# Patient Record
Sex: FEMALE | Race: ASIAN | NOT HISPANIC OR LATINO
[De-identification: names, ages, dates, MRNs, and addresses within clinical notes are randomized per-mention and may not be internally consistent; named-entity substitution may affect disease eponyms.]

---

## 2022-11-21 PROBLEM — Z00.00 ENCOUNTER FOR PREVENTIVE HEALTH EXAMINATION: Status: ACTIVE | Noted: 2022-11-21

## 2022-11-28 ENCOUNTER — APPOINTMENT (OUTPATIENT)
Dept: OTOLARYNGOLOGY | Facility: CLINIC | Age: 52
End: 2022-11-28

## 2022-11-28 VITALS
HEART RATE: 91 BPM | BODY MASS INDEX: 28.7 KG/M2 | DIASTOLIC BLOOD PRESSURE: 107 MMHG | HEIGHT: 63 IN | SYSTOLIC BLOOD PRESSURE: 153 MMHG | WEIGHT: 162 LBS | TEMPERATURE: 96 F

## 2022-11-28 DIAGNOSIS — D44.2 NEOPLASM OF UNCERTAIN BEHAVIOR OF PARATHYROID GLAND: ICD-10-CM

## 2022-11-28 PROCEDURE — 31575 DIAGNOSTIC LARYNGOSCOPY: CPT

## 2022-11-28 PROCEDURE — 99203 OFFICE O/P NEW LOW 30 MIN: CPT | Mod: 25

## 2023-01-12 ENCOUNTER — TRANSCRIPTION ENCOUNTER (OUTPATIENT)
Age: 53
End: 2023-01-12

## 2023-01-12 VITALS
TEMPERATURE: 97 F | WEIGHT: 155.43 LBS | DIASTOLIC BLOOD PRESSURE: 83 MMHG | OXYGEN SATURATION: 97 % | HEIGHT: 63 IN | RESPIRATION RATE: 16 BRPM | SYSTOLIC BLOOD PRESSURE: 133 MMHG | HEART RATE: 76 BPM

## 2023-01-12 NOTE — ASU PREOP CHECKLIST - 1.
Informed ENt team that H&P  as its from 22. Also informed ENT team and anesthesia that there is no EKG on file and only blood work on file is CBC. No new orders at this time. Informed ENt team that H&P  as its from 22. Also informed ENT team and anesthesia that there is no EKG on file and only blood work on file is CBC.

## 2023-01-12 NOTE — ASU PATIENT PROFILE, ADULT - NSICDXPASTMEDICALHX_GEN_ALL_CORE_FT
PAST MEDICAL HISTORY:  Migraines     Prediabetes      PAST MEDICAL HISTORY:  Hyperlipidemia     Migraines     Prediabetes

## 2023-01-12 NOTE — ASU PATIENT PROFILE, ADULT - FALL HARM RISK - UNIVERSAL INTERVENTIONS
Bed in lowest position, wheels locked, appropriate side rails in place/Call bell, personal items and telephone in reach/Instruct patient to call for assistance before getting out of bed or chair/Non-slip footwear when patient is out of bed/[place] to call system/Physically safe environment - no spills, clutter or unnecessary equipment/Purposeful Proactive Rounding/Room/bathroom lighting operational, light cord in reach

## 2023-01-13 ENCOUNTER — INPATIENT (INPATIENT)
Facility: HOSPITAL | Age: 53
LOS: 2 days | Discharge: HOME | DRG: 626 | End: 2023-01-16
Attending: SPECIALIST | Admitting: SPECIALIST
Payer: COMMERCIAL

## 2023-01-13 ENCOUNTER — RESULT REVIEW (OUTPATIENT)
Age: 53
End: 2023-01-13

## 2023-01-13 ENCOUNTER — TRANSCRIPTION ENCOUNTER (OUTPATIENT)
Age: 53
End: 2023-01-13

## 2023-01-13 ENCOUNTER — APPOINTMENT (OUTPATIENT)
Dept: OTOLARYNGOLOGY | Facility: HOSPITAL | Age: 53
End: 2023-01-13

## 2023-01-13 LAB
ANION GAP SERPL CALC-SCNC: 9 MMOL/L — SIGNIFICANT CHANGE UP (ref 5–17)
APTT BLD: 36.6 SEC — HIGH (ref 27.5–35.5)
BUN SERPL-MCNC: 17 MG/DL — SIGNIFICANT CHANGE UP (ref 7–23)
CALCIUM SERPL-MCNC: 10 MG/DL — SIGNIFICANT CHANGE UP (ref 8.4–10.5)
CALCIUM SERPL-MCNC: 11.4 MG/DL — HIGH (ref 8.4–10.5)
CHLORIDE SERPL-SCNC: 106 MMOL/L — SIGNIFICANT CHANGE UP (ref 96–108)
CO2 SERPL-SCNC: 27 MMOL/L — SIGNIFICANT CHANGE UP (ref 22–31)
CREAT SERPL-MCNC: 0.77 MG/DL — SIGNIFICANT CHANGE UP (ref 0.5–1.3)
EGFR: 93 ML/MIN/1.73M2 — SIGNIFICANT CHANGE UP
GLUCOSE BLDC GLUCOMTR-MCNC: 123 MG/DL — HIGH (ref 70–99)
GLUCOSE BLDC GLUCOMTR-MCNC: 150 MG/DL — HIGH (ref 70–99)
GLUCOSE BLDC GLUCOMTR-MCNC: 155 MG/DL — HIGH (ref 70–99)
GLUCOSE BLDC GLUCOMTR-MCNC: 180 MG/DL — HIGH (ref 70–99)
GLUCOSE SERPL-MCNC: 129 MG/DL — HIGH (ref 70–99)
HCT VFR BLD CALC: 43.5 % — SIGNIFICANT CHANGE UP (ref 34.5–45)
HGB BLD-MCNC: 13.9 G/DL — SIGNIFICANT CHANGE UP (ref 11.5–15.5)
INR BLD: 0.96 — SIGNIFICANT CHANGE UP (ref 0.88–1.16)
MCHC RBC-ENTMCNC: 27.3 PG — SIGNIFICANT CHANGE UP (ref 27–34)
MCHC RBC-ENTMCNC: 32 GM/DL — SIGNIFICANT CHANGE UP (ref 32–36)
MCV RBC AUTO: 85.3 FL — SIGNIFICANT CHANGE UP (ref 80–100)
NRBC # BLD: 0 /100 WBCS — SIGNIFICANT CHANGE UP (ref 0–0)
PLATELET # BLD AUTO: 193 K/UL — SIGNIFICANT CHANGE UP (ref 150–400)
POTASSIUM SERPL-MCNC: 4.4 MMOL/L — SIGNIFICANT CHANGE UP (ref 3.5–5.3)
POTASSIUM SERPL-SCNC: 4.4 MMOL/L — SIGNIFICANT CHANGE UP (ref 3.5–5.3)
PROTHROM AB SERPL-ACNC: 11.4 SEC — SIGNIFICANT CHANGE UP (ref 10.5–13.4)
PTH-INTACT IO % DIF SERPL: 13 PG/ML — LOW (ref 15–65)
PTH-INTACT IO % DIF SERPL: 15.7 PG/ML — SIGNIFICANT CHANGE UP (ref 15–65)
PTH-INTACT IO % DIF SERPL: 77.8 PG/ML — HIGH (ref 15–65)
RBC # BLD: 5.1 M/UL — SIGNIFICANT CHANGE UP (ref 3.8–5.2)
RBC # FLD: 13.6 % — SIGNIFICANT CHANGE UP (ref 10.3–14.5)
SODIUM SERPL-SCNC: 142 MMOL/L — SIGNIFICANT CHANGE UP (ref 135–145)
WBC # BLD: 6.92 K/UL — SIGNIFICANT CHANGE UP (ref 3.8–10.5)
WBC # FLD AUTO: 6.92 K/UL — SIGNIFICANT CHANGE UP (ref 3.8–10.5)

## 2023-01-13 PROCEDURE — 88331 PATH CONSLTJ SURG 1 BLK 1SPC: CPT | Mod: 26

## 2023-01-13 PROCEDURE — 88305 TISSUE EXAM BY PATHOLOGIST: CPT | Mod: 26

## 2023-01-13 PROCEDURE — 60500 EXPLORE PARATHYROID GLANDS: CPT

## 2023-01-13 DEVICE — CLIP APPLIER ETHICON LIGACLIP 11.5" MEDIUM: Type: IMPLANTABLE DEVICE | Status: FUNCTIONAL

## 2023-01-13 DEVICE — CLIP APPLIER ETHICON LIGACLIP 9 3/8" SMALL: Type: IMPLANTABLE DEVICE | Status: FUNCTIONAL

## 2023-01-13 DEVICE — SURGICEL 4 X 8": Type: IMPLANTABLE DEVICE | Status: FUNCTIONAL

## 2023-01-13 RX ORDER — SODIUM CHLORIDE 9 MG/ML
1000 INJECTION, SOLUTION INTRAVENOUS
Refills: 0 | Status: DISCONTINUED | OUTPATIENT
Start: 2023-01-13 | End: 2023-01-16

## 2023-01-13 RX ORDER — DEXTROSE 50 % IN WATER 50 %
12.5 SYRINGE (ML) INTRAVENOUS ONCE
Refills: 0 | Status: DISCONTINUED | OUTPATIENT
Start: 2023-01-13 | End: 2023-01-16

## 2023-01-13 RX ORDER — METFORMIN HYDROCHLORIDE 850 MG/1
0 TABLET ORAL
Qty: 0 | Refills: 0 | DISCHARGE

## 2023-01-13 RX ORDER — OXYCODONE HYDROCHLORIDE 5 MG/1
10 TABLET ORAL EVERY 6 HOURS
Refills: 0 | Status: DISCONTINUED | OUTPATIENT
Start: 2023-01-13 | End: 2023-01-16

## 2023-01-13 RX ORDER — ACETAMINOPHEN 500 MG
1000 TABLET ORAL ONCE
Refills: 0 | Status: COMPLETED | OUTPATIENT
Start: 2023-01-13 | End: 2023-01-13

## 2023-01-13 RX ORDER — OXYCODONE AND ACETAMINOPHEN 5; 325 MG/1; MG/1
1 TABLET ORAL EVERY 6 HOURS
Refills: 0 | Status: DISCONTINUED | OUTPATIENT
Start: 2023-01-13 | End: 2023-01-13

## 2023-01-13 RX ORDER — BENZOCAINE AND MENTHOL 5; 1 G/100ML; G/100ML
1 LIQUID ORAL
Refills: 0 | Status: DISCONTINUED | OUTPATIENT
Start: 2023-01-13 | End: 2023-01-16

## 2023-01-13 RX ORDER — HYDROMORPHONE HYDROCHLORIDE 2 MG/ML
0.5 INJECTION INTRAMUSCULAR; INTRAVENOUS; SUBCUTANEOUS ONCE
Refills: 0 | Status: DISCONTINUED | OUTPATIENT
Start: 2023-01-13 | End: 2023-01-13

## 2023-01-13 RX ORDER — DEXTROSE 50 % IN WATER 50 %
15 SYRINGE (ML) INTRAVENOUS ONCE
Refills: 0 | Status: DISCONTINUED | OUTPATIENT
Start: 2023-01-13 | End: 2023-01-16

## 2023-01-13 RX ORDER — OXYCODONE AND ACETAMINOPHEN 5; 325 MG/1; MG/1
2 TABLET ORAL EVERY 6 HOURS
Refills: 0 | Status: DISCONTINUED | OUTPATIENT
Start: 2023-01-13 | End: 2023-01-13

## 2023-01-13 RX ORDER — OXYCODONE HYDROCHLORIDE 5 MG/1
10 TABLET ORAL EVERY 4 HOURS
Refills: 0 | Status: DISCONTINUED | OUTPATIENT
Start: 2023-01-13 | End: 2023-01-13

## 2023-01-13 RX ORDER — ACETAMINOPHEN 500 MG
650 TABLET ORAL EVERY 6 HOURS
Refills: 0 | Status: DISCONTINUED | OUTPATIENT
Start: 2023-01-13 | End: 2023-01-13

## 2023-01-13 RX ORDER — DEXTROSE 50 % IN WATER 50 %
25 SYRINGE (ML) INTRAVENOUS ONCE
Refills: 0 | Status: DISCONTINUED | OUTPATIENT
Start: 2023-01-13 | End: 2023-01-16

## 2023-01-13 RX ORDER — ACETAMINOPHEN 500 MG
650 TABLET ORAL EVERY 6 HOURS
Refills: 0 | Status: DISCONTINUED | OUTPATIENT
Start: 2023-01-13 | End: 2023-01-16

## 2023-01-13 RX ORDER — INSULIN LISPRO 100/ML
VIAL (ML) SUBCUTANEOUS
Refills: 0 | Status: DISCONTINUED | OUTPATIENT
Start: 2023-01-13 | End: 2023-01-16

## 2023-01-13 RX ORDER — ATORVASTATIN CALCIUM 80 MG/1
1 TABLET, FILM COATED ORAL
Qty: 0 | Refills: 0 | DISCHARGE

## 2023-01-13 RX ORDER — LANOLIN ALCOHOL/MO/W.PET/CERES
3 CREAM (GRAM) TOPICAL AT BEDTIME
Refills: 0 | Status: DISCONTINUED | OUTPATIENT
Start: 2023-01-13 | End: 2023-01-16

## 2023-01-13 RX ORDER — GLUCAGON INJECTION, SOLUTION 0.5 MG/.1ML
1 INJECTION, SOLUTION SUBCUTANEOUS ONCE
Refills: 0 | Status: DISCONTINUED | OUTPATIENT
Start: 2023-01-13 | End: 2023-01-16

## 2023-01-13 RX ORDER — ONDANSETRON 8 MG/1
4 TABLET, FILM COATED ORAL EVERY 6 HOURS
Refills: 0 | Status: DISCONTINUED | OUTPATIENT
Start: 2023-01-13 | End: 2023-01-16

## 2023-01-13 RX ORDER — HEPARIN SODIUM 5000 [USP'U]/ML
5000 INJECTION INTRAVENOUS; SUBCUTANEOUS EVERY 8 HOURS
Refills: 0 | Status: DISCONTINUED | OUTPATIENT
Start: 2023-01-13 | End: 2023-01-15

## 2023-01-13 RX ORDER — PANTOPRAZOLE SODIUM 20 MG/1
40 TABLET, DELAYED RELEASE ORAL DAILY
Refills: 0 | Status: DISCONTINUED | OUTPATIENT
Start: 2023-01-13 | End: 2023-01-14

## 2023-01-13 RX ORDER — OXYCODONE HYDROCHLORIDE 5 MG/1
5 TABLET ORAL EVERY 6 HOURS
Refills: 0 | Status: DISCONTINUED | OUTPATIENT
Start: 2023-01-13 | End: 2023-01-16

## 2023-01-13 RX ORDER — CEFAZOLIN SODIUM 1 G
2000 VIAL (EA) INJECTION EVERY 8 HOURS
Refills: 0 | Status: COMPLETED | OUTPATIENT
Start: 2023-01-13 | End: 2023-01-13

## 2023-01-13 RX ORDER — ATORVASTATIN CALCIUM 80 MG/1
10 TABLET, FILM COATED ORAL AT BEDTIME
Refills: 0 | Status: DISCONTINUED | OUTPATIENT
Start: 2023-01-13 | End: 2023-01-16

## 2023-01-13 RX ORDER — OXYCODONE HYDROCHLORIDE 5 MG/1
5 TABLET ORAL EVERY 4 HOURS
Refills: 0 | Status: DISCONTINUED | OUTPATIENT
Start: 2023-01-13 | End: 2023-01-13

## 2023-01-13 RX ORDER — SODIUM CHLORIDE 9 MG/ML
1000 INJECTION, SOLUTION INTRAVENOUS
Refills: 0 | Status: DISCONTINUED | OUTPATIENT
Start: 2023-01-13 | End: 2023-01-14

## 2023-01-13 RX ADMIN — OXYCODONE AND ACETAMINOPHEN 2 TABLET(S): 5; 325 TABLET ORAL at 17:01

## 2023-01-13 RX ADMIN — Medication 100 MILLIGRAM(S): at 15:09

## 2023-01-13 RX ADMIN — Medication 1: at 18:52

## 2023-01-13 RX ADMIN — OXYCODONE AND ACETAMINOPHEN 2 TABLET(S): 5; 325 TABLET ORAL at 16:01

## 2023-01-13 RX ADMIN — ATORVASTATIN CALCIUM 10 MILLIGRAM(S): 80 TABLET, FILM COATED ORAL at 21:38

## 2023-01-13 RX ADMIN — SODIUM CHLORIDE 75 MILLILITER(S): 9 INJECTION, SOLUTION INTRAVENOUS at 21:53

## 2023-01-13 RX ADMIN — Medication 1: at 10:56

## 2023-01-13 RX ADMIN — HYDROMORPHONE HYDROCHLORIDE 0.5 MILLIGRAM(S): 2 INJECTION INTRAMUSCULAR; INTRAVENOUS; SUBCUTANEOUS at 13:09

## 2023-01-13 RX ADMIN — Medication 1000 MILLIGRAM(S): at 11:09

## 2023-01-13 RX ADMIN — HYDROMORPHONE HYDROCHLORIDE 0.5 MILLIGRAM(S): 2 INJECTION INTRAMUSCULAR; INTRAVENOUS; SUBCUTANEOUS at 13:30

## 2023-01-13 RX ADMIN — HYDROMORPHONE HYDROCHLORIDE 0.5 MILLIGRAM(S): 2 INJECTION INTRAMUSCULAR; INTRAVENOUS; SUBCUTANEOUS at 11:09

## 2023-01-13 RX ADMIN — PANTOPRAZOLE SODIUM 40 MILLIGRAM(S): 20 TABLET, DELAYED RELEASE ORAL at 14:32

## 2023-01-13 RX ADMIN — HEPARIN SODIUM 5000 UNIT(S): 5000 INJECTION INTRAVENOUS; SUBCUTANEOUS at 21:39

## 2023-01-13 RX ADMIN — HYDROMORPHONE HYDROCHLORIDE 0.5 MILLIGRAM(S): 2 INJECTION INTRAMUSCULAR; INTRAVENOUS; SUBCUTANEOUS at 10:50

## 2023-01-13 RX ADMIN — OXYCODONE HYDROCHLORIDE 10 MILLIGRAM(S): 5 TABLET ORAL at 22:38

## 2023-01-13 RX ADMIN — Medication 3 MILLIGRAM(S): at 23:17

## 2023-01-13 RX ADMIN — Medication 400 MILLIGRAM(S): at 10:50

## 2023-01-13 RX ADMIN — OXYCODONE HYDROCHLORIDE 10 MILLIGRAM(S): 5 TABLET ORAL at 21:38

## 2023-01-13 RX ADMIN — HEPARIN SODIUM 5000 UNIT(S): 5000 INJECTION INTRAVENOUS; SUBCUTANEOUS at 14:19

## 2023-01-13 RX ADMIN — Medication 100 MILLIGRAM(S): at 23:07

## 2023-01-13 RX ADMIN — BENZOCAINE AND MENTHOL 1 LOZENGE: 5; 1 LIQUID ORAL at 14:19

## 2023-01-13 NOTE — H&P ADULT - ASSESSMENT
53yoF with PMHx HLD, T2DM and hyperparathyroidism now s/p left upper parathyroidectomy.  - Admitted to Dr. Morton on ENT service for observation overnight  - Next serum calcium at 4PM  - Ancef x24h  - JEANNE drain management  - Home medications -- hold ASA  - Regular diet, ambulate as tolerated, no strenuous activity  - DVT ppx  - Dispo home once calcium stabilized and JEANNE output appropriate

## 2023-01-13 NOTE — H&P ADULT - NSHPPHYSICALEXAM_GEN_ALL_CORE
General: NAD, sleepy from anesthesia  Head: NCAT, facial movement grossly symmetric  Eyes: EOMI  Nose: clear anteriorly  OC/OP: MMM, tongue midline, OP clear  Neck: soft, flat, incision with steri strips c/d/i, JEANNE left with serosanguinous drainage  Respiratory: NLB on RA, no stridor or stertor, no significant hoarseness with phonation

## 2023-01-13 NOTE — H&P ADULT - HISTORY OF PRESENT ILLNESS
53yoF with PMHx HLD, T2DM now s/p left upper parathyroidectomy for hyperparathyroidism.  Intraoperative frozen pathology consistent with hypercellular parathyroid tissue, 264mg.  PTH dropped from 77 to 15 to 13.  Intraoperative course uneventful.  Patient was transferred to the PACU without issue.

## 2023-01-14 ENCOUNTER — TRANSCRIPTION ENCOUNTER (OUTPATIENT)
Age: 53
End: 2023-01-14

## 2023-01-14 LAB
CALCIUM SERPL-MCNC: 8.8 MG/DL — SIGNIFICANT CHANGE UP (ref 8.4–10.5)
CALCIUM SERPL-MCNC: 9 MG/DL — SIGNIFICANT CHANGE UP (ref 8.4–10.5)
GLUCOSE BLDC GLUCOMTR-MCNC: 116 MG/DL — HIGH (ref 70–99)
GLUCOSE BLDC GLUCOMTR-MCNC: 122 MG/DL — HIGH (ref 70–99)
GLUCOSE BLDC GLUCOMTR-MCNC: 187 MG/DL — HIGH (ref 70–99)
GLUCOSE BLDC GLUCOMTR-MCNC: 194 MG/DL — HIGH (ref 70–99)

## 2023-01-14 RX ORDER — PANTOPRAZOLE SODIUM 20 MG/1
40 TABLET, DELAYED RELEASE ORAL DAILY
Refills: 0 | Status: DISCONTINUED | OUTPATIENT
Start: 2023-01-14 | End: 2023-01-16

## 2023-01-14 RX ADMIN — SODIUM CHLORIDE 75 MILLILITER(S): 9 INJECTION, SOLUTION INTRAVENOUS at 05:51

## 2023-01-14 RX ADMIN — Medication 650 MILLIGRAM(S): at 06:51

## 2023-01-14 RX ADMIN — HEPARIN SODIUM 5000 UNIT(S): 5000 INJECTION INTRAVENOUS; SUBCUTANEOUS at 14:29

## 2023-01-14 RX ADMIN — ONDANSETRON 4 MILLIGRAM(S): 8 TABLET, FILM COATED ORAL at 09:04

## 2023-01-14 RX ADMIN — Medication 1: at 12:21

## 2023-01-14 RX ADMIN — ATORVASTATIN CALCIUM 10 MILLIGRAM(S): 80 TABLET, FILM COATED ORAL at 21:35

## 2023-01-14 RX ADMIN — Medication 650 MILLIGRAM(S): at 15:23

## 2023-01-14 RX ADMIN — Medication 650 MILLIGRAM(S): at 14:28

## 2023-01-14 RX ADMIN — Medication 650 MILLIGRAM(S): at 22:00

## 2023-01-14 RX ADMIN — HEPARIN SODIUM 5000 UNIT(S): 5000 INJECTION INTRAVENOUS; SUBCUTANEOUS at 05:51

## 2023-01-14 RX ADMIN — HEPARIN SODIUM 5000 UNIT(S): 5000 INJECTION INTRAVENOUS; SUBCUTANEOUS at 21:37

## 2023-01-14 RX ADMIN — PANTOPRAZOLE SODIUM 40 MILLIGRAM(S): 20 TABLET, DELAYED RELEASE ORAL at 14:28

## 2023-01-14 RX ADMIN — Medication 650 MILLIGRAM(S): at 21:36

## 2023-01-14 RX ADMIN — Medication 1: at 21:35

## 2023-01-14 RX ADMIN — Medication 650 MILLIGRAM(S): at 05:51

## 2023-01-15 ENCOUNTER — TRANSCRIPTION ENCOUNTER (OUTPATIENT)
Age: 53
End: 2023-01-15

## 2023-01-15 LAB
ANION GAP SERPL CALC-SCNC: 8 MMOL/L — SIGNIFICANT CHANGE UP (ref 5–17)
APTT BLD: 32.3 SEC — SIGNIFICANT CHANGE UP (ref 27.5–35.5)
BLD GP AB SCN SERPL QL: NEGATIVE — SIGNIFICANT CHANGE UP
BUN SERPL-MCNC: 11 MG/DL — SIGNIFICANT CHANGE UP (ref 7–23)
CALCIUM SERPL-MCNC: 9 MG/DL — SIGNIFICANT CHANGE UP (ref 8.4–10.5)
CHLORIDE SERPL-SCNC: 104 MMOL/L — SIGNIFICANT CHANGE UP (ref 96–108)
CO2 SERPL-SCNC: 26 MMOL/L — SIGNIFICANT CHANGE UP (ref 22–31)
CREAT SERPL-MCNC: 0.74 MG/DL — SIGNIFICANT CHANGE UP (ref 0.5–1.3)
EGFR: 97 ML/MIN/1.73M2 — SIGNIFICANT CHANGE UP
GLUCOSE BLDC GLUCOMTR-MCNC: 120 MG/DL — HIGH (ref 70–99)
GLUCOSE BLDC GLUCOMTR-MCNC: 123 MG/DL — HIGH (ref 70–99)
GLUCOSE BLDC GLUCOMTR-MCNC: 164 MG/DL — HIGH (ref 70–99)
GLUCOSE BLDC GLUCOMTR-MCNC: 229 MG/DL — HIGH (ref 70–99)
GLUCOSE SERPL-MCNC: 205 MG/DL — HIGH (ref 70–99)
HCT VFR BLD CALC: 37.9 % — SIGNIFICANT CHANGE UP (ref 34.5–45)
HGB BLD-MCNC: 12.7 G/DL — SIGNIFICANT CHANGE UP (ref 11.5–15.5)
INR BLD: 1.16 — SIGNIFICANT CHANGE UP (ref 0.88–1.16)
MCHC RBC-ENTMCNC: 28.2 PG — SIGNIFICANT CHANGE UP (ref 27–34)
MCHC RBC-ENTMCNC: 33.5 GM/DL — SIGNIFICANT CHANGE UP (ref 32–36)
MCV RBC AUTO: 84 FL — SIGNIFICANT CHANGE UP (ref 80–100)
NRBC # BLD: 0 /100 WBCS — SIGNIFICANT CHANGE UP (ref 0–0)
PLATELET # BLD AUTO: 173 K/UL — SIGNIFICANT CHANGE UP (ref 150–400)
POTASSIUM SERPL-MCNC: 3.8 MMOL/L — SIGNIFICANT CHANGE UP (ref 3.5–5.3)
POTASSIUM SERPL-SCNC: 3.8 MMOL/L — SIGNIFICANT CHANGE UP (ref 3.5–5.3)
PROTHROM AB SERPL-ACNC: 13.8 SEC — HIGH (ref 10.5–13.4)
RBC # BLD: 4.51 M/UL — SIGNIFICANT CHANGE UP (ref 3.8–5.2)
RBC # FLD: 13.8 % — SIGNIFICANT CHANGE UP (ref 10.3–14.5)
RH IG SCN BLD-IMP: POSITIVE — SIGNIFICANT CHANGE UP
SARS-COV-2 RNA SPEC QL NAA+PROBE: NEGATIVE — SIGNIFICANT CHANGE UP
SODIUM SERPL-SCNC: 138 MMOL/L — SIGNIFICANT CHANGE UP (ref 135–145)
WBC # BLD: 8.23 K/UL — SIGNIFICANT CHANGE UP (ref 3.8–10.5)
WBC # FLD AUTO: 8.23 K/UL — SIGNIFICANT CHANGE UP (ref 3.8–10.5)

## 2023-01-15 PROCEDURE — 35800 EXPLORE NECK VESSELS: CPT | Mod: 1L,78

## 2023-01-15 RX ORDER — HYDROMORPHONE HYDROCHLORIDE 2 MG/ML
0.5 INJECTION INTRAMUSCULAR; INTRAVENOUS; SUBCUTANEOUS ONCE
Refills: 0 | Status: DISCONTINUED | OUTPATIENT
Start: 2023-01-15 | End: 2023-01-15

## 2023-01-15 RX ORDER — ASPIRIN/CALCIUM CARB/MAGNESIUM 324 MG
0 TABLET ORAL
Qty: 0 | Refills: 0 | DISCHARGE

## 2023-01-15 RX ORDER — ACETAMINOPHEN 500 MG
2 TABLET ORAL
Qty: 0 | Refills: 0 | DISCHARGE
Start: 2023-01-15

## 2023-01-15 RX ORDER — SODIUM CHLORIDE 9 MG/ML
1000 INJECTION, SOLUTION INTRAVENOUS
Refills: 0 | Status: DISCONTINUED | OUTPATIENT
Start: 2023-01-15 | End: 2023-01-16

## 2023-01-15 RX ORDER — LIDOCAINE 4 G/100G
1 CREAM TOPICAL
Qty: 5 | Refills: 0
Start: 2023-01-15 | End: 2023-01-19

## 2023-01-15 RX ORDER — OXYCODONE HYDROCHLORIDE 5 MG/1
1 TABLET ORAL
Qty: 12 | Refills: 0
Start: 2023-01-15 | End: 2023-01-17

## 2023-01-15 RX ORDER — LIDOCAINE 4 G/100G
1 CREAM TOPICAL DAILY
Refills: 0 | Status: DISCONTINUED | OUTPATIENT
Start: 2023-01-15 | End: 2023-01-16

## 2023-01-15 RX ADMIN — OXYCODONE HYDROCHLORIDE 10 MILLIGRAM(S): 5 TABLET ORAL at 22:59

## 2023-01-15 RX ADMIN — Medication 650 MILLIGRAM(S): at 21:59

## 2023-01-15 RX ADMIN — Medication 1: at 18:26

## 2023-01-15 RX ADMIN — OXYCODONE HYDROCHLORIDE 10 MILLIGRAM(S): 5 TABLET ORAL at 21:59

## 2023-01-15 RX ADMIN — Medication 650 MILLIGRAM(S): at 06:34

## 2023-01-15 RX ADMIN — HYDROMORPHONE HYDROCHLORIDE 0.5 MILLIGRAM(S): 2 INJECTION INTRAMUSCULAR; INTRAVENOUS; SUBCUTANEOUS at 18:21

## 2023-01-15 RX ADMIN — PANTOPRAZOLE SODIUM 40 MILLIGRAM(S): 20 TABLET, DELAYED RELEASE ORAL at 13:52

## 2023-01-15 RX ADMIN — OXYCODONE HYDROCHLORIDE 10 MILLIGRAM(S): 5 TABLET ORAL at 14:00

## 2023-01-15 RX ADMIN — ATORVASTATIN CALCIUM 10 MILLIGRAM(S): 80 TABLET, FILM COATED ORAL at 21:58

## 2023-01-15 RX ADMIN — LIDOCAINE 1 PATCH: 4 CREAM TOPICAL at 12:13

## 2023-01-15 RX ADMIN — Medication 650 MILLIGRAM(S): at 20:03

## 2023-01-15 RX ADMIN — HEPARIN SODIUM 5000 UNIT(S): 5000 INJECTION INTRAVENOUS; SUBCUTANEOUS at 13:52

## 2023-01-15 RX ADMIN — OXYCODONE HYDROCHLORIDE 10 MILLIGRAM(S): 5 TABLET ORAL at 08:47

## 2023-01-15 RX ADMIN — Medication 2: at 22:31

## 2023-01-15 RX ADMIN — Medication 3 MILLIGRAM(S): at 21:58

## 2023-01-15 RX ADMIN — Medication 650 MILLIGRAM(S): at 07:00

## 2023-01-15 RX ADMIN — HEPARIN SODIUM 5000 UNIT(S): 5000 INJECTION INTRAVENOUS; SUBCUTANEOUS at 05:24

## 2023-01-15 NOTE — PROVIDER CONTACT NOTE (OTHER) - BACKGROUND
Dr. Mayo scoped pt at bedside via left nare. Per Dr. Mayo, Pt going to OR today for further assessment/intervention. Pt aware of plan of care.

## 2023-01-15 NOTE — PRE-OP CHECKLIST - PATIENT'S PERSONAL PROPERTY GIVEN TO
valuable envelope #[id number]/security/safe valuable envelope #[id number], and all clothings, shoes/security/safe

## 2023-01-15 NOTE — BRIEF OPERATIVE NOTE - OPERATION/FINDINGS
left upper parathyroid identified and sent for frozen pathology which demonstrated hypercellular parathyroid tissue, 264mg.  Intraoperative PTH 77.8 to 15.7 to 13.0
s/p neck exploration and ligation of bleeding vessels. Hemostasis achieved. JPx1
none

## 2023-01-15 NOTE — DISCHARGE NOTE PROVIDER - NSDCCPCAREPLAN_GEN_ALL_CORE_FT
PRINCIPAL DISCHARGE DIAGNOSIS  Diagnosis: Hyperparathyroidism  Assessment and Plan of Treatment:

## 2023-01-15 NOTE — PRE-ANESTHESIA EVALUATION ADULT - NSANTHOSAYNRD_GEN_A_CORE
No. LEWIS screening performed.  STOP BANG Legend: 0-2 = LOW Risk; 3-4 = INTERMEDIATE Risk; 5-8 = HIGH Risk
No. LEWIS screening performed.  STOP BANG Legend: 0-2 = LOW Risk; 3-4 = INTERMEDIATE Risk; 5-8 = HIGH Risk

## 2023-01-15 NOTE — DISCHARGE NOTE PROVIDER - NSDCCPTREATMENT_GEN_ALL_CORE_FT
PRINCIPAL PROCEDURE  Procedure: Evacuation of hematoma of neck  Findings and Treatment:       SECONDARY PROCEDURE  Procedure: Evacuation of hematoma of neck  Findings and Treatment:

## 2023-01-15 NOTE — DISCHARGE NOTE PROVIDER - NSDCFUADDINST_GEN_ALL_CORE_FT
Take tylenol or oxycodone for pain as needed - can use every 6 hours.    If you feel numbness or tinging, especially around mouth or on finger tips, please take THREE tums or calcium tablets and come to ED immediately. You may be having symptoms of low calcium levels.    Please call clinic or come to ED if worsening neck swelling, bleeding that does not stop or issues with breathing.    General Discharge Instructions:  Please resume all regular home medications unless specifically advised not to take a particular medication. Also, please take any new medications as prescribed.  Please get plenty of rest, continue to ambulate several times per day, and drink adequate amounts of fluids. Avoid lifting weights greater than 5-10 lbs until you follow-up with your surgeon, who will instruct you further regarding activity restrictions.  Avoid driving or operating heavy machinery while taking pain medications.  Please follow-up with your surgeon and Primary Care Provider (PCP) as advised.  Incision Care:  *Please call your doctor or nurse practitioner if you have increased pain, swelling, redness, or drainage from the incision site.  *Avoid swimming and baths until your follow-up appointment.  *You may shower, and wash surgical incisions with a mild soap and warm water. Gently pat the area dry.  *If you have staples, they will be removed at your follow-up appointment.  *If you have steri-strips, they will fall off on their own. Please remove any remaining strips 7-10 days after surgery.  Warning Signs:  Please call your doctor or nurse practitioner if you experience the following:  *You experience new chest pain, pressure, squeezing or tightness.  *New or worsening cough, shortness of breath, or wheeze.  *If you are vomiting and cannot keep down fluids or your medications.  *You are getting dehydrated due to continued vomiting, diarrhea, or other reasons. Signs of dehydration include dry mouth, rapid heartbeat, or feeling dizzy or faint when standing.  *You see blood or dark/black material when you vomit or have a bowel movement.  *You experience burning when you urinate, have blood in your urine, or experience a discharge.  *Your pain is not improving within 8-12 hours or is not gone within 24 hours. Call or return immediately if your pain is getting worse, changes location, or moves to your chest or back.  *You have shaking chills, or fever greater than 101.5 degrees Fahrenheit or 38 degrees Celsius.  *Any change in your symptoms, or any new symptoms that concern you.   Do not re-start your aspirin until you have seen Dr. Morton in the office on Wednesday, 01/18/2023    Take tylenol or oxycodone for pain as needed - can use every 6 hours.    If you feel numbness or tinging, especially around mouth or on finger tips, please take THREE tums or calcium tablets and come to ED immediately. You may be having symptoms of low calcium levels.    Please call clinic or come to ED if worsening neck swelling, bleeding that does not stop or issues with breathing.    General Discharge Instructions:  Please resume all regular home medications unless specifically advised not to take a particular medication. Also, please take any new medications as prescribed.  Please get plenty of rest, continue to ambulate several times per day, and drink adequate amounts of fluids. Avoid lifting weights greater than 5-10 lbs until you follow-up with your surgeon, who will instruct you further regarding activity restrictions.  Avoid driving or operating heavy machinery while taking pain medications.  Please follow-up with your surgeon and Primary Care Provider (PCP) as advised.  Incision Care:  *Please call your doctor or nurse practitioner if you have increased pain, swelling, redness, or drainage from the incision site.  *Avoid swimming and baths until your follow-up appointment.  *You may shower, and wash surgical incisions with a mild soap and warm water. Gently pat the area dry.  *If you have staples, they will be removed at your follow-up appointment.  *If you have steri-strips, they will fall off on their own. Please remove any remaining strips 7-10 days after surgery.  Warning Signs:  Please call your doctor or nurse practitioner if you experience the following:  *You experience new chest pain, pressure, squeezing or tightness.  *New or worsening cough, shortness of breath, or wheeze.  *If you are vomiting and cannot keep down fluids or your medications.  *You are getting dehydrated due to continued vomiting, diarrhea, or other reasons. Signs of dehydration include dry mouth, rapid heartbeat, or feeling dizzy or faint when standing.  *You see blood or dark/black material when you vomit or have a bowel movement.  *You experience burning when you urinate, have blood in your urine, or experience a discharge.  *Your pain is not improving within 8-12 hours or is not gone within 24 hours. Call or return immediately if your pain is getting worse, changes location, or moves to your chest or back.  *You have shaking chills, or fever greater than 101.5 degrees Fahrenheit or 38 degrees Celsius.  *Any change in your symptoms, or any new symptoms that concern you.

## 2023-01-15 NOTE — DISCHARGE NOTE PROVIDER - NSDCMRMEDTOKEN_GEN_ALL_CORE_FT
aspirin 81 mg oral tablet: orally once a day  atorvastatin 10 mg oral tablet: 1 tab(s) orally once a day  metFORMIN 500 mg oral tablet: orally once a day  Vitamin B Complex 100: injectable once a day   acetaminophen 325 mg oral tablet: 2 tab(s) orally every 6 hours, As needed, Mild Pain (1 - 3)  atorvastatin 10 mg oral tablet: 1 tab(s) orally once a day  lidocaine 4% topical film: Apply topically to affected area once a day MDD:1  metFORMIN 500 mg oral tablet: orally once a day  oxyCODONE 5 mg oral tablet: 1 tab(s) orally every 6 hours, As Needed -for severe pain MDD:4   Vitamin B Complex 100: injectable once a day

## 2023-01-15 NOTE — DISCHARGE NOTE PROVIDER - HOSPITAL COURSE
The patient is s/p parathyroidectomy. Postoperatively the patient recovered in the PACU, was hemodynamically stable, and was sent to the floor. The patients pain was controlled with PO pain medications. The patient is tolerating current diet regiment. The patient had a JEANNE drain that was removed prior to discharge. The patient was hemodynamically stable and placed on home medications. The patient will follow up with Dr. Morton    OTOLARYNGOLOGY (ENT) DISCHARGE SUMMARY    PATIENT: KIM BARNES               : 72  MRN: [id number]  ADMISSION DATE: 23  Discharge Date: 23 @ 12:56  Attending Physician: Joanne Morton    Admission Diagnosis:  D44.2      Status post:Parathyroidectomy    Exploration, wound, neck    Complications: None    Consultations: None    Chronic Conditions:  Prediabetes    Migraines    Hyperlipidemia      HPI:KIM BARNES  is a 53F HLD, T2DM s/p left superior parathyroidectomy for hyperparathyroidism , and return to OR on 1/15/23 for evacuation of hematoma, neck exploration, and vessel ligation.    Brief Hospital Course:    The patient was taken to the operating room at [facility] and underwent parathyroidectomy on 23. Please refer to operation report for detail. The patient tolerated the procedure well. Patient's JEANNE drain was removed on 23, and patient ultimately developed an anterior neck hematoma on 01/15/23, that required returning to the operating room for hematoma evacuation, neck exploration, and vessel ligation. Please see full operative report. Patient was monitored overnight, and had no issues in morning of 23. Patient's JEANNE drain was removed without issue midday, and patient was monitored until the evening to ensure no reaccumulation of blood in the neck. On discharge, patient was eating, drinking, and ambulating without issue. Pain controlled with oral medications. No difficulty breathing, swallowing, speaking.      Discharge PE:  GEN: appears stated age, NAD  NEURO: alert & oriented x 4/4  HEENT: face symmetric, oropharynx moist without exudates; neck is soft, flat; steri-strips in place  CVS: regular rate and rhythm, no ectopy on monitor  Pulm: normal respiratory excursions, not tachypneic, no labored breathing  Abd: non-distended  Ext: moving all four extremities, no peripheral edema noted    Patient is cleared for discharge from an ENT standpoint. Patient's pain is controlled and was transitioned to PO pain medications. Patient is tolerating current diet and is ambulating well. The patient is noted to be afebrile, vitals are stable,  WBC and sodium WNL. The patient was told to follow up with Dr. Morton on Wednesday, 2023.    Disposition: Home with family    Discharge Condition: Stable

## 2023-01-15 NOTE — BRIEF OPERATIVE NOTE - NSICDXBRIEFPREOP_GEN_ALL_CORE_FT
PRE-OP DIAGNOSIS:  Hyperparathyroidism 13-Jan-2023 10:18:53  Elvira Stuart  
PRE-OP DIAGNOSIS:  Hematoma of neck 15-Brice-2023 19:56:35  Pete Mayo

## 2023-01-15 NOTE — PRE-ANESTHESIA EVALUATION ADULT - NSANTHNPOEMERGSD_GEN_ALL_CORE
pt s/p parathyroidectomy with new swelling and vocal changes, cannot delay due to possible expanding neck hematoma. Pt informed of increased risk of apsiration/Emergency case

## 2023-01-15 NOTE — BRIEF OPERATIVE NOTE - NSICDXBRIEFPROCEDURE_GEN_ALL_CORE_FT
PROCEDURES:  Exploration, wound, neck 15-Brice-2023 19:56:18  Pete Mayo  
PROCEDURES:  Parathyroidectomy 13-Jan-2023 10:18:45  Elvira Stuart

## 2023-01-15 NOTE — PROVIDER CONTACT NOTE (OTHER) - SITUATION
pt s/p Parathyroidectomy 1/13/2023, steri-strips to anterior neck intact. pt endorsed new "bump" swelling on left side and voice changed to raspy sound. no SOB,  /86, HR 72, O2 sat 98% room air.

## 2023-01-15 NOTE — DISCHARGE NOTE PROVIDER - CARE PROVIDER_API CALL
Joanne Valdivia)  [facility] Surgery; Otolaryngology  [address] [place] [address], [address] [place] [address]  Phone: [phone]  Fax: [phone]  Follow Up Time:

## 2023-01-16 ENCOUNTER — TRANSCRIPTION ENCOUNTER (OUTPATIENT)
Age: 53
End: 2023-01-16

## 2023-01-16 VITALS
SYSTOLIC BLOOD PRESSURE: 105 MMHG | DIASTOLIC BLOOD PRESSURE: 71 MMHG | HEART RATE: 84 BPM | TEMPERATURE: 98 F | OXYGEN SATURATION: 96 % | RESPIRATION RATE: 18 BRPM

## 2023-01-16 LAB
GLUCOSE BLDC GLUCOMTR-MCNC: 111 MG/DL — HIGH (ref 70–99)
GLUCOSE BLDC GLUCOMTR-MCNC: 172 MG/DL — HIGH (ref 70–99)
GLUCOSE BLDC GLUCOMTR-MCNC: 200 MG/DL — HIGH (ref 70–99)
HCT VFR BLD CALC: 37.2 % — SIGNIFICANT CHANGE UP (ref 34.5–45)
HGB BLD-MCNC: 12.1 G/DL — SIGNIFICANT CHANGE UP (ref 11.5–15.5)
MAGNESIUM SERPL-MCNC: 2 MG/DL — SIGNIFICANT CHANGE UP (ref 1.6–2.6)
MCHC RBC-ENTMCNC: 27.4 PG — SIGNIFICANT CHANGE UP (ref 27–34)
MCHC RBC-ENTMCNC: 32.5 GM/DL — SIGNIFICANT CHANGE UP (ref 32–36)
MCV RBC AUTO: 84.2 FL — SIGNIFICANT CHANGE UP (ref 80–100)
NRBC # BLD: 0 /100 WBCS — SIGNIFICANT CHANGE UP (ref 0–0)
PHOSPHATE SERPL-MCNC: 3.7 MG/DL — SIGNIFICANT CHANGE UP (ref 2.5–4.5)
PLATELET # BLD AUTO: 196 K/UL — SIGNIFICANT CHANGE UP (ref 150–400)
RBC # BLD: 4.42 M/UL — SIGNIFICANT CHANGE UP (ref 3.8–5.2)
RBC # FLD: 13.7 % — SIGNIFICANT CHANGE UP (ref 10.3–14.5)
WBC # BLD: 9.27 K/UL — SIGNIFICANT CHANGE UP (ref 3.8–10.5)
WBC # FLD AUTO: 9.27 K/UL — SIGNIFICANT CHANGE UP (ref 3.8–10.5)

## 2023-01-16 PROCEDURE — 36415 COLL VENOUS BLD VENIPUNCTURE: CPT

## 2023-01-16 PROCEDURE — 88305 TISSUE EXAM BY PATHOLOGIST: CPT

## 2023-01-16 PROCEDURE — 80048 BASIC METABOLIC PNL TOTAL CA: CPT

## 2023-01-16 PROCEDURE — 83970 ASSAY OF PARATHORMONE: CPT

## 2023-01-16 PROCEDURE — 85027 COMPLETE CBC AUTOMATED: CPT

## 2023-01-16 PROCEDURE — 86901 BLOOD TYPING SEROLOGIC RH(D): CPT

## 2023-01-16 PROCEDURE — 82310 ASSAY OF CALCIUM: CPT

## 2023-01-16 PROCEDURE — 85730 THROMBOPLASTIN TIME PARTIAL: CPT

## 2023-01-16 PROCEDURE — 86900 BLOOD TYPING SEROLOGIC ABO: CPT

## 2023-01-16 PROCEDURE — C1889: CPT

## 2023-01-16 PROCEDURE — 84100 ASSAY OF PHOSPHORUS: CPT

## 2023-01-16 PROCEDURE — 87635 SARS-COV-2 COVID-19 AMP PRB: CPT

## 2023-01-16 PROCEDURE — 88331 PATH CONSLTJ SURG 1 BLK 1SPC: CPT

## 2023-01-16 PROCEDURE — 83735 ASSAY OF MAGNESIUM: CPT

## 2023-01-16 PROCEDURE — 82962 GLUCOSE BLOOD TEST: CPT

## 2023-01-16 PROCEDURE — 86850 RBC ANTIBODY SCREEN: CPT

## 2023-01-16 PROCEDURE — 85610 PROTHROMBIN TIME: CPT

## 2023-01-16 RX ADMIN — SODIUM CHLORIDE 75 MILLILITER(S): 9 INJECTION, SOLUTION INTRAVENOUS at 04:55

## 2023-01-16 RX ADMIN — OXYCODONE HYDROCHLORIDE 10 MILLIGRAM(S): 5 TABLET ORAL at 04:55

## 2023-01-16 RX ADMIN — Medication 1: at 17:36

## 2023-01-16 RX ADMIN — BENZOCAINE AND MENTHOL 1 LOZENGE: 5; 1 LIQUID ORAL at 04:58

## 2023-01-16 RX ADMIN — Medication 1: at 12:20

## 2023-01-16 RX ADMIN — OXYCODONE HYDROCHLORIDE 10 MILLIGRAM(S): 5 TABLET ORAL at 12:19

## 2023-01-16 RX ADMIN — OXYCODONE HYDROCHLORIDE 10 MILLIGRAM(S): 5 TABLET ORAL at 05:55

## 2023-01-16 RX ADMIN — LIDOCAINE 1 PATCH: 4 CREAM TOPICAL at 12:19

## 2023-01-16 RX ADMIN — OXYCODONE HYDROCHLORIDE 10 MILLIGRAM(S): 5 TABLET ORAL at 13:18

## 2023-01-16 RX ADMIN — LIDOCAINE 1 PATCH: 4 CREAM TOPICAL at 17:42

## 2023-01-16 RX ADMIN — PANTOPRAZOLE SODIUM 40 MILLIGRAM(S): 20 TABLET, DELAYED RELEASE ORAL at 12:19

## 2023-01-16 NOTE — PROGRESS NOTE ADULT - ASSESSMENT
53F HLD, T2DM s/p left superior parathyroidectomy for hyperparathyroidism 1/13. JEANNE removed this morning with oozing that resolved with pressure. Pressure dressing placed. AVSS.    - Regular diet  - monitor for neck swelling or difficulty breathing.  - Notify ENT immediately if any neck swelling or respiratory issues  - Pain/nausea control  - home meds  - SCDs  - Ambulate as tolerated  - likely dc home today  - hold aspirin until follow up appointment
53F HLD, T2DM s/p left superior parathyroidectomy for hyperparathyroidism 1/13. JEANNE removed this morning with oozing that resolved with pressure. Pressure dressing placed. AVSS.    - Keep NPO  - monitor for neck swelling or difficulty breathing.  - Notify ENT immediately if any neck swelling or respiratory issues  - Pain/nausea control  - home meds  - SCDs  - Ambulate as tolerated  - repeat calcium 1pm
A/P: 53W hx HLD, T2DM s/p left superior parathyroidectomy for hyperparathyroidism 1/13 with return to OR on 1/15/23 for evacuation of hematoma, neck exploration, and vessel ligation. Recovering appropriately this AM, JEANNE drain removed, plan to monitor for a few hours and discharge later today.    - Continue regular diet  - Pain/nausea control  - Continue home medications  - SCDs  - Ambulate as tolerated  - Hold aspirin until follow up appointment with Dr. Morton on Wednesday, 01/18/23  - Plan for discharge this afternoon
53F HLD, T2DM s/p left superior parathyroidectomy for hyperparathyroidism 1/13. JEANNE removed this morning with oozing that resolved with pressure. Pressure dressing placed. AVSS.    - Regular diet  - monitor for neck swelling or difficulty breathing.  - Notify ENT immediately if any neck swelling or respiratory issues  - Pain/nausea control  - home meds  - SCDs  - Ambulate as tolerated  - hold aspirin until follow up appointment

## 2023-01-16 NOTE — DISCHARGE NOTE NURSING/CASE MANAGEMENT/SOCIAL WORK - NSDCPEFALRISK_GEN_ALL_CORE
For information on Fall & Injury Prevention, visit: [email or web address] OR  [email or web address] OR  https://www.cdc.gov/steadi/patient.html

## 2023-01-16 NOTE — DISCHARGE NOTE NURSING/CASE MANAGEMENT/SOCIAL WORK - NSDCPNINST_GEN_ALL_CORE
Call Dr. Valdivia if you have any questions or concerns. Keep head of bed elevated while in bed using pillows until further instructions was Dr. Valdivia. Educational material given- [employer] Patient Education: Surgical Wound Discharge INstructions

## 2023-01-16 NOTE — PROGRESS NOTE ADULT - SUBJECTIVE AND OBJECTIVE BOX
ENT Progress Note    HPI: 53F HLD, T2DM s/p left superior parathyroidectomy for hyperparathyroidism 1/13    Interval:  1/14: CRISTAEON. examined at bedside. AM calcium 8.8. No numbness or tingling. Complains of neck pain, tolerating PO this morning. Has been ambulating. JEANNE removed at bedside with oozing that resolved with pressure. Otherwise AVSS, no respiratory issues.    MEDICATIONS  (STANDING):  atorvastatin 10 milliGRAM(s) Oral at bedtime  dextrose 5%. 1000 milliLiter(s) (50 mL/Hr) IV Continuous <Continuous>  dextrose 5%. 1000 milliLiter(s) (100 mL/Hr) IV Continuous <Continuous>  dextrose 50% Injectable 25 Gram(s) IV Push once  dextrose 50% Injectable 12.5 Gram(s) IV Push once  dextrose 50% Injectable 25 Gram(s) IV Push once  glucagon  Injectable 1 milliGRAM(s) IntraMuscular once  heparin   Injectable 5000 Unit(s) SubCutaneous every 8 hours  insulin lispro (ADMELOG) corrective regimen sliding scale   SubCutaneous Before meals and at bedtime  pantoprazole   Suspension 40 milliGRAM(s) Oral daily    MEDICATIONS  (PRN):  acetaminophen     Tablet .. 650 milliGRAM(s) Oral every 6 hours PRN Mild Pain (1 - 3)  benzocaine 15 mG/menthol 3.6 mG Lozenge 1 Lozenge Oral four times a day PRN Sore Throat  dextrose Oral Gel 15 Gram(s) Oral once PRN Blood Glucose LESS THAN 70 milliGRAM(s)/deciliter  melatonin 3 milliGRAM(s) Oral at bedtime PRN Sleep  ondansetron Injectable 4 milliGRAM(s) IV Push every 6 hours PRN Nausea  oxyCODONE    IR 5 milliGRAM(s) Oral every 6 hours PRN Moderate Pain (4 - 6)  oxyCODONE    IR 10 milliGRAM(s) Oral every 6 hours PRN Severe Pain (7 - 10)    ICU Vital Signs Last 24 Hrs  T(C): 36.4 (14 Jan 2023 09:09), Max: 36.6 (13 Jan 2023 13:25)  T(F): 97.6 (14 Jan 2023 09:09), Max: 97.9 (13 Jan 2023 13:25)  HR: 86 (14 Jan 2023 09:09) (79 - 90)  BP: 97/63 (14 Jan 2023 09:09) (97/63 - 133/83)  BP(mean): 74 (14 Jan 2023 09:09) (74 - 100)  ABP: --  ABP(mean): --  RR: 17 (14 Jan 2023 09:09) (16 - 17)  SpO2: 98% (14 Jan 2023 09:09) (93% - 98%)    O2 Parameters below as of 14 Jan 2023 09:09  Patient On (Oxygen Delivery Method): room air    PHYSICAL EXAM:    CONSTITUTIONAL: Well nourished, well developed, NON-DYSMORPHIC, and in no acute distress.    HEAD: normocephalic, atraumatic.  EARS: The right/left pinna was normal.   NOSE: Normal external nose.   NECK: soft, flat, minimal fullness, tender to palpation. JEANNE 55cc over 24 hours, 5 cc overnight SS.   RESPIRATORY: Respirations unlabored, no increased work of breathing with use of accessory muscles and retractions. No stridor.  CARDIAC: Warm extremities, no cyanosis.      
ENT Progress Note    HPI: 53F HLD, T2DM s/p left superior parathyroidectomy for hyperparathyroidism 1/13    Interval:  1/14: NAEON. examined at bedside. AM calcium 8.8. No numbness or tingling. Complains of neck pain, tolerating PO this morning. Has been ambulating. JEANNE removed at bedside with oozing that resolved with pressure. Otherwise AVSS, no respiratory issues.  1/15: NAEON. Tolerating PO. Complaining of upper back pain. No bleeding. No respiratory issues. AVSS  1/15: Pt reported change in voice stating it became more raspy/breathy. Increased focal area of firmness at JEANNE site. Now s/p RTOR for neck exploration and ligation of bleeding vessels. In PACU pt is recovering from anesthesia. Neck is soft. JEANNE drain stripped. AVSS    MEDICATIONS  (STANDING):  atorvastatin 10 milliGRAM(s) Oral at bedtime  dextrose 5%. 1000 milliLiter(s) (50 mL/Hr) IV Continuous <Continuous>  dextrose 5%. 1000 milliLiter(s) (100 mL/Hr) IV Continuous <Continuous>  dextrose 50% Injectable 25 Gram(s) IV Push once  dextrose 50% Injectable 12.5 Gram(s) IV Push once  dextrose 50% Injectable 25 Gram(s) IV Push once  glucagon  Injectable 1 milliGRAM(s) IntraMuscular once  heparin   Injectable 5000 Unit(s) SubCutaneous every 8 hours  insulin lispro (ADMELOG) corrective regimen sliding scale   SubCutaneous Before meals and at bedtime  pantoprazole   Suspension 40 milliGRAM(s) Oral daily    MEDICATIONS  (PRN):  acetaminophen     Tablet .. 650 milliGRAM(s) Oral every 6 hours PRN Mild Pain (1 - 3)  benzocaine 15 mG/menthol 3.6 mG Lozenge 1 Lozenge Oral four times a day PRN Sore Throat  dextrose Oral Gel 15 Gram(s) Oral once PRN Blood Glucose LESS THAN 70 milliGRAM(s)/deciliter  melatonin 3 milliGRAM(s) Oral at bedtime PRN Sleep  ondansetron Injectable 4 milliGRAM(s) IV Push every 6 hours PRN Nausea  oxyCODONE    IR 5 milliGRAM(s) Oral every 6 hours PRN Moderate Pain (4 - 6)  oxyCODONE    IR 10 milliGRAM(s) Oral every 6 hours PRN Severe Pain (7 - 10)    ICU Vital Signs Last 24 Hrs  T(C): 36.8 (15 Brice 2023 18:01), Max: 37.1 (15 Brice 2023 08:49)  T(F): 98.3 (15 Brice 2023 18:01), Max: 98.7 (15 Brice 2023 08:49)  HR: 92 (15 Brice 2023 19:31) (72 - 106)  BP: 135/76 (15 Brice 2023 19:31) (128/78 - 146/77)  BP(mean): 99 (15 Brice 2023 18:16) (99 - 105)  ABP: --  ABP(mean): --  RR: 18 (15 Brice 2023 19:31) (17 - 27)  SpO2: 93% (15 Brice 2023 19:31) (93% - 98%)    O2 Parameters below as of 15 Brice 2023 19:31  Patient On (Oxygen Delivery Method): nasal cannula  O2 Flow (L/min): 2        PHYSICAL EXAM:    CONSTITUTIONAL: Well nourished, well developed, NON-DYSMORPHIC, and in no acute distress.    HEAD: normocephalic, atraumatic.  EARS: The right/left pinna was normal.   NOSE: Normal external nose.   NECK: soft, flat, JPx1  RESPIRATORY: Respirations unlabored, no increased work of breathing with use of accessory muscles and retractions. No stridor.  CARDIAC: Warm extremities, no cyanosis.      
ENT Progress Note    HPI: 53F HLD, T2DM s/p left superior parathyroidectomy for hyperparathyroidism 1/13    Interval:  1/14: NAEON. examined at bedside. AM calcium 8.8. No numbness or tingling. Complains of neck pain, tolerating PO this morning. Has been ambulating. JEANNE removed at bedside with oozing that resolved with pressure. Otherwise AVSS, no respiratory issues.  1/15: NAEON. Tolerating PO. Complaining of upper back pain. No bleeding. No respiratory issues. AVSS    MEDICATIONS  (STANDING):  atorvastatin 10 milliGRAM(s) Oral at bedtime  dextrose 5%. 1000 milliLiter(s) (50 mL/Hr) IV Continuous <Continuous>  dextrose 5%. 1000 milliLiter(s) (100 mL/Hr) IV Continuous <Continuous>  dextrose 50% Injectable 25 Gram(s) IV Push once  dextrose 50% Injectable 12.5 Gram(s) IV Push once  dextrose 50% Injectable 25 Gram(s) IV Push once  glucagon  Injectable 1 milliGRAM(s) IntraMuscular once  heparin   Injectable 5000 Unit(s) SubCutaneous every 8 hours  insulin lispro (ADMELOG) corrective regimen sliding scale   SubCutaneous Before meals and at bedtime  pantoprazole   Suspension 40 milliGRAM(s) Oral daily    MEDICATIONS  (PRN):  acetaminophen     Tablet .. 650 milliGRAM(s) Oral every 6 hours PRN Mild Pain (1 - 3)  benzocaine 15 mG/menthol 3.6 mG Lozenge 1 Lozenge Oral four times a day PRN Sore Throat  dextrose Oral Gel 15 Gram(s) Oral once PRN Blood Glucose LESS THAN 70 milliGRAM(s)/deciliter  melatonin 3 milliGRAM(s) Oral at bedtime PRN Sleep  ondansetron Injectable 4 milliGRAM(s) IV Push every 6 hours PRN Nausea  oxyCODONE    IR 5 milliGRAM(s) Oral every 6 hours PRN Moderate Pain (4 - 6)  oxyCODONE    IR 10 milliGRAM(s) Oral every 6 hours PRN Severe Pain (7 - 10)    ICU Vital Signs Last 24 Hrs  T(C): 37.1 (15 Brice 2023 08:49), Max: 37.1 (15 Brice 2023 08:49)  T(F): 98.7 (15 Brice 2023 08:49), Max: 98.7 (15 Brice 2023 08:49)  HR: 79 (15 Brice 2023 08:49) (75 - 85)  BP: 142/91 (15 Brice 2023 08:49) (128/78 - 142/91)  BP(mean): --  ABP: --  ABP(mean): --  RR: 17 (15 Brice 2023 08:49) (17 - 18)  SpO2: 97% (15 Brice 2023 08:49) (94% - 98%)    O2 Parameters below as of 15 Brice 2023 08:49  Patient On (Oxygen Delivery Method): room air      PHYSICAL EXAM:    CONSTITUTIONAL: Well nourished, well developed, NON-DYSMORPHIC, and in no acute distress.    HEAD: normocephalic, atraumatic.  EARS: The right/left pinna was normal.   NOSE: Normal external nose.   NECK: soft, flat, minimal fullness, exquisitely tender to palpation.  RESPIRATORY: Respirations unlabored, no increased work of breathing with use of accessory muscles and retractions. No stridor.  CARDIAC: Warm extremities, no cyanosis.      
OTOLARYNGOLOGY (ENT) PROGRESS NOTE    PATIENT: KIM BARNES  MRN: [id number]  : 72  [id number]  DATE OF SERVICE:  23  			         HPI: 53F HLD, T2DM s/p left superior parathyroidectomy for hyperparathyroidism , and return to OR on 1/15/23 for evacuation of hematoma, neck exploration, and vessel ligation.    Interval:  : NAEON. examined at bedside. AM calcium 8.8. No numbness or tingling. Complains of neck pain, tolerating PO this morning. Has been ambulating. JEANNE removed at bedside with oozing that resolved with pressure. Otherwise AVSS, no respiratory issues.  1/15: Patient developed hematoma, and was taken to OR for neck exploration with vessel ligation. JEANNE drain placed.  : Patient seen and examined at bedside this AM. No issues overnight, feels well this AM. No chest pain, difficulty breathing. Endorses minimal scratchy voice, which she says has greatly improved since her take back surgery yesterday.     ALLERGIES:  No Known Allergies      MEDICATIONS:  Antiinfectives:     IV fluids:  dextrose 5%. 1000 milliLiter(s) IV Continuous <Continuous>  dextrose 5%. 1000 milliLiter(s) IV Continuous <Continuous>  lactated ringers. 1000 milliLiter(s) IV Continuous <Continuous>    Hematologic/Anticoagulation:    Pain medications/Neuro:  acetaminophen     Tablet .. 650 milliGRAM(s) Oral every 6 hours PRN  melatonin 3 milliGRAM(s) Oral at bedtime PRN  ondansetron Injectable 4 milliGRAM(s) IV Push every 6 hours PRN  oxyCODONE    IR 5 milliGRAM(s) Oral every 6 hours PRN  oxyCODONE    IR 10 milliGRAM(s) Oral every 6 hours PRN    Endocrine Medications:   atorvastatin 10 milliGRAM(s) Oral at bedtime  dextrose 50% Injectable 25 Gram(s) IV Push once  dextrose 50% Injectable 12.5 Gram(s) IV Push once  dextrose 50% Injectable 25 Gram(s) IV Push once  dextrose Oral Gel 15 Gram(s) Oral once PRN  glucagon  Injectable 1 milliGRAM(s) IntraMuscular once  insulin lispro (ADMELOG) corrective regimen sliding scale   SubCutaneous Before meals and at bedtime    All other standing medications:   lidocaine   4% Patch 1 Patch Transdermal daily  pantoprazole  Injectable 40 milliGRAM(s) IV Push daily    All other PRN medications:  benzocaine 15 mG/menthol 3.6 mG Lozenge 1 Lozenge Oral four times a day PRN    Vital Signs Last 24 Hrs  T(C): 36.9 (2023 08:49), Max: 37 (15 Brice 2023 20:25)  T(F): 98.4 (2023 08:49), Max: 98.6 (15 Brice 2023 20:25)  HR: 82 (2023 08:49) (72 - 106)  BP: 121/76 (2023 08:49) (121/76 - 146/77)  BP(mean): 91 (2023 08:49) (91 - 105)  RR: 17 (2023 08:49) (16 - 27)  SpO2: 95% (2023 08:49) (93% - 98%)    Parameters below as of 2023 08:49  Patient On (Oxygen Delivery Method): room air          01-15 @ 07: @ 07:00  --------------------------------------------------------  IN:    Lactated Ringers: 900 mL    Oral Fluid: 520 mL  Total IN: 1420 mL    OUT:    Bulb (mL): 15 mL    Voided (mL): 1900 mL  Total OUT: 1915 mL    Total NET: -495 mL       @ 07: @ 12:47  --------------------------------------------------------  IN:    Lactated Ringers: 75 mL    Oral Fluid: 680 mL  Total IN: 755 mL    OUT:    Voided (mL): 0 mL  Total OUT: 0 mL    Total NET: 755 mL          01-15-23 @ 07:23 @ 07:00  --------------------------------------------------------  IN:  Total IN: 0 mL    OUT:    Bulb (mL): 15 mL  Total OUT: 15 mL    Total NET: -15 mL      PHYSICAL EXAM:  GEN: appears stated age, NAD  NEURO: alert & oriented x   HEENT: face symmetric, oropharynx moist without exudates; neck is soft, flat; steri-strips in place; JEANNE drain removed at bedside without issue  CVS: regular rate and rhythm, no ectopy on monitor  Pulm: normal respiratory excursions, not tachypneic, no labored breathing  Abd: non-distended  Ext: moving all four extremities, no peripheral edema noted       LABS                       12.1   9.27  )-----------( 196      ( 2023 05:30 )             37.2    01-15    138  |  104  |  11  ----------------------------<  205<H>  3.8   |  26  |  0.74    Ca    9.0      15 Brice 2023 14:26  Phos  3.7       Mg     2.0                Coagulation Studies-   PT/INR - ( 15 Brice 2023 14:26 )   PT: 13.8 sec;   INR: 1.16          PTT - ( 15 Brice 2023 14:26 )  PTT:32.3 sec    Endocrine Panel-  Calcium, Total Serum: 9.0 mg/dL (01-15 @ 14:26)              COVID-19 PCR: Negative (01-15-23 @ 14:26)

## 2023-01-16 NOTE — DISCHARGE NOTE NURSING/CASE MANAGEMENT/SOCIAL WORK - PATIENT PORTAL LINK FT
You can access the FollowMyHealth Patient Portal offered by [facility] by registering at the following website: [email or web address]. By joining [facility]’s FollowMyHealth portal, you will also be able to view your health information using other applications (apps) compatible with our system.

## 2023-01-18 PROBLEM — G43.909 MIGRAINE, UNSPECIFIED, NOT INTRACTABLE, WITHOUT STATUS MIGRAINOSUS: Chronic | Status: ACTIVE | Noted: 2023-01-12

## 2023-01-18 PROBLEM — R73.03 PREDIABETES: Chronic | Status: ACTIVE | Noted: 2023-01-12

## 2023-01-18 PROBLEM — E78.5 HYPERLIPIDEMIA, UNSPECIFIED: Chronic | Status: ACTIVE | Noted: 2023-01-13

## 2023-01-19 ENCOUNTER — APPOINTMENT (OUTPATIENT)
Dept: OTOLARYNGOLOGY | Facility: CLINIC | Age: 53
End: 2023-01-19
Payer: MEDICAID

## 2023-01-19 DIAGNOSIS — E83.51 HYPOCALCEMIA: ICD-10-CM

## 2023-01-19 DIAGNOSIS — E21.3 HYPERPARATHYROIDISM, UNSPECIFIED: ICD-10-CM

## 2023-01-19 PROCEDURE — 99024 POSTOP FOLLOW-UP VISIT: CPT

## 2023-01-19 RX ORDER — IBUPROFEN 200 MG
500-3.125 CAPSULE ORAL
Qty: 50 | Refills: 0 | Status: ACTIVE | COMMUNITY
Start: 2023-01-19 | End: 1900-01-01

## 2023-01-20 DIAGNOSIS — M96.841 POSTPROCEDURAL HEMATOMA OF A MUSCULOSKELETAL STRUCTURE FOLLOWING OTHER PROCEDURE: ICD-10-CM

## 2023-01-20 DIAGNOSIS — E83.52 HYPERCALCEMIA: ICD-10-CM

## 2023-01-20 DIAGNOSIS — D35.1 BENIGN NEOPLASM OF PARATHYROID GLAND: ICD-10-CM

## 2023-01-20 DIAGNOSIS — Z79.82 LONG TERM (CURRENT) USE OF ASPIRIN: ICD-10-CM

## 2023-01-20 DIAGNOSIS — Z79.84 LONG TERM (CURRENT) USE OF ORAL HYPOGLYCEMIC DRUGS: ICD-10-CM
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
## 2023-01-20 DIAGNOSIS — E21.0 PRIMARY HYPERPARATHYROIDISM: ICD-10-CM
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
## 2023-01-20 DIAGNOSIS — E78.5 HYPERLIPIDEMIA, UNSPECIFIED: ICD-10-CM

## 2023-01-20 DIAGNOSIS — E11.9 TYPE 2 DIABETES MELLITUS WITHOUT COMPLICATIONS: ICD-10-CM

## 2023-01-24 LAB
CALCIUM SERPL-MCNC: 10.6 MG/DL
GLUCOSE SERPL-MCNC: 127 MG/DL

## 2023-02-06 LAB — SURGICAL PATHOLOGY STUDY: SIGNIFICANT CHANGE UP

## 2023-03-06 ENCOUNTER — APPOINTMENT (OUTPATIENT)
Dept: OTOLARYNGOLOGY | Facility: CLINIC | Age: 53
End: 2023-03-06

## 2025-07-18 NOTE — ASU PATIENT PROFILE, ADULT - ACCEPTABLE
----- Message from Kaitlin CEVALLOS sent at 7/18/2025 10:48 AM EDT -----  Regarding: Specialty Message to Provider  Specialty Message to Provider    Relationship to Patient: Self     Patient Message: THE PT NEEDS TO LET DR GLEASON'S SX  KNOW THAT HE WAS ABLE TO GET AN APPT FOR A PRE-OP H&P SCHEDULED FOR TODAY AT 3 PM.    --------------------------------------------------------------------------------------------------------------------------    Call Back Information: OK to leave message on voicemail  Preferred Call Back Number: [phone]   1

## (undated) DEVICE — LIGASURE EXACT DISSECTOR

## (undated) DEVICE — DRAPE MAGNETIC INSTRUMENT MEDIUM

## (undated) DEVICE — VENODYNE/SCD SLEEVE CALF MEDIUM

## (undated) DEVICE — RUBBERBAND STRL LTX FR 200/CA 3X1/8IN

## (undated) DEVICE — SUT SILK 2-0 30" PSL

## (undated) DEVICE — BLADE SCALPEL SAFETY #15 WITH PLASTIC GREEN HANDLE

## (undated) DEVICE — SHEARS HARMONIC FOCUS CURVED SHEARS 9CM

## (undated) DEVICE — STAPLER SKIN PROXIMATE

## (undated) DEVICE — HANDPIECE HARMONIC BLUE

## (undated) DEVICE — SPONGE ENDO PEANUT 5MM

## (undated) DEVICE — SUT SILK 3-0 18" TIES

## (undated) DEVICE — TRAY IRRIGATION SYR BULB 60CC

## (undated) DEVICE — SUT CHROMIC 3-0 27" SH

## (undated) DEVICE — SUT PROLENE 4-0 18" PS-2

## (undated) DEVICE — SUT PLAIN GUT FAST ABSORBING 5-0 PC-1

## (undated) DEVICE — SUCTION YANKAUER BULBOUS TIP NO VENT

## (undated) DEVICE — SUT CHROMIC 4-0 27" SH-1

## (undated) DEVICE — SPONGE PEANUT AUTO COUNT

## (undated) DEVICE — SUCTION YANKAUER BULBOUS TIP W VENT

## (undated) DEVICE — BIPOLAR FORCEP SYMMETRY BAYONET STR 8.25" X 1.5MM (BLUE)

## (undated) DEVICE — SUT CHROMIC 4-0 27" SH

## (undated) DEVICE — SUT SILK 2-0 30" SH

## (undated) DEVICE — DRSG TEGADERM 4X4.75"

## (undated) DEVICE — MARKING PEN W RULER

## (undated) DEVICE — GLV 7.5 PROTEXIS (WHITE)

## (undated) DEVICE — DRSG TEGADERM 2.5X3"

## (undated) DEVICE — DRSG MASTISOL

## (undated) DEVICE — DRAIN JACKSON PRATT 7MM FLAT 3/4 NO TROCAR

## (undated) DEVICE — DRAPE TOWEL BLUE 17" X 24"

## (undated) DEVICE — SUT SILK 2-0 12-18"

## (undated) DEVICE — DRAIN SILICONE ROUND 9FR

## (undated) DEVICE — WARMING BLANKET LOWER ADULT

## (undated) DEVICE — PACK UPPER BODY

## (undated) DEVICE — DRAIN RESERVOIR FOR JACKSON PRATT 100CC CARDINAL